# Patient Record
Sex: FEMALE | Race: BLACK OR AFRICAN AMERICAN | Employment: FULL TIME | URBAN - METROPOLITAN AREA
[De-identification: names, ages, dates, MRNs, and addresses within clinical notes are randomized per-mention and may not be internally consistent; named-entity substitution may affect disease eponyms.]

---

## 2017-02-28 ENCOUNTER — OFFICE VISIT (OUTPATIENT)
Dept: INTERNAL MEDICINE CLINIC | Age: 37
End: 2017-02-28

## 2017-02-28 VITALS
SYSTOLIC BLOOD PRESSURE: 120 MMHG | BODY MASS INDEX: 41.48 KG/M2 | DIASTOLIC BLOOD PRESSURE: 86 MMHG | WEIGHT: 243 LBS | HEART RATE: 86 BPM | HEIGHT: 64 IN | TEMPERATURE: 98.1 F | OXYGEN SATURATION: 100 %

## 2017-02-28 DIAGNOSIS — R11.0 NAUSEA: ICD-10-CM

## 2017-02-28 DIAGNOSIS — R68.83 CHILLS: ICD-10-CM

## 2017-02-28 DIAGNOSIS — R50.9 FEVER, UNSPECIFIED FEVER CAUSE: ICD-10-CM

## 2017-02-28 DIAGNOSIS — J06.9 URI, ACUTE: Primary | ICD-10-CM

## 2017-02-28 DIAGNOSIS — L03.313 CELLULITIS OF CHEST WALL: ICD-10-CM

## 2017-02-28 DIAGNOSIS — Z00.00 ROUTINE GENERAL MEDICAL EXAMINATION AT A HEALTH CARE FACILITY: ICD-10-CM

## 2017-02-28 LAB
QUICKVUE INFLUENZA TEST: NEGATIVE
VALID INTERNAL CONTROL?: YES

## 2017-02-28 RX ORDER — SULFAMETHOXAZOLE AND TRIMETHOPRIM 800; 160 MG/1; MG/1
1 TABLET ORAL 2 TIMES DAILY
Qty: 14 TAB | Refills: 0 | Status: SHIPPED | OUTPATIENT
Start: 2017-02-28 | End: 2017-03-07

## 2017-02-28 RX ORDER — MUPIROCIN 20 MG/G
OINTMENT TOPICAL DAILY
Qty: 22 G | Refills: 0 | Status: SHIPPED | OUTPATIENT
Start: 2017-02-28

## 2017-02-28 NOTE — PATIENT INSTRUCTIONS
It was a pleasure to see you! As discussed:    Upper respiratory tract infection (URI)  You have a bacterial URI. - Take the antibiotics as prescribed. -Use nasal saline spray 3-4 times/day  -Start taking a non sedating antihistamine such as Claritin, Allegra or Zyrtec (generic is fine)  For your  Symptoms:  -Sore throat: Cepacol or similar lozenges, Salt water gargles  -Itching: Thick creams/ lotions; Nonsedating antihistamine such as Allegra, Zyrtec, or Claritin (generic is fine)  -Pain/ Aches: You can use Ibuprofen (no more than 2400 mg/day) or Aleve (no more than 880mg/ day) as needed. Do not use any other NSAIDs while on this medication. Do not use NSAIDs if you have high blood pressure or history of ulcers or abnormal bleeding. OR   -Take Tylenol as needed for headache and body aches (every 4-8 hours, do not use more than 3000mg in one day)         Cellulitis: Care Instructions  Your Care Instructions    Cellulitis is a skin infection. It often occurs after a break in the skin from a scrape, cut, bite, or puncture, or after a rash. The doctor has checked you carefully, but problems can develop later. If you notice any problems or new symptoms, get medical treatment right away. Follow-up care is a key part of your treatment and safety. Be sure to make and go to all appointments, and call your doctor if you are having problems. It's also a good idea to know your test results and keep a list of the medicines you take. How can you care for yourself at home? · Take your antibiotics as directed. Do not stop taking them just because you feel better. You need to take the full course of antibiotics. · Prop up the infected area on pillows to reduce pain and swelling. Try to keep the area above the level of your heart as often as you can. · If your doctor told you how to care for your wound, follow your doctor's instructions.  If you did not get instructions, follow this general advice:  ¨ Wash the wound with clean water 2 times a day. Don't use hydrogen peroxide or alcohol, which can slow healing. ¨ You may cover the wound with a thin layer of petroleum jelly, such as Vaseline, and a nonstick bandage. ¨ Apply more petroleum jelly and replace the bandage as needed. · Be safe with medicines. Take pain medicines exactly as directed. ¨ If the doctor gave you a prescription medicine for pain, take it as prescribed. ¨ If you are not taking a prescription pain medicine, ask your doctor if you can take an over-the-counter medicine. To prevent cellulitis in the future  · Try to prevent cuts, scrapes, or other injuries to your skin. Cellulitis most often occurs where there is a break in the skin. · If you get a scrape, cut, mild burn, or bite, wash the wound with clean water as soon as you can to help avoid infection. Don't use hydrogen peroxide or alcohol, which can slow healing. · If you have swelling in your legs (edema), support stockings and good skin care may help prevent leg sores and cellulitis. · Take care of your feet, especially if you have diabetes or other conditions that increase the risk of infection. Wear shoes and socks. Do not go barefoot. If you have athlete's foot or other skin problems on your feet, talk to your doctor about how to treat them. When should you call for help? Call your doctor now or seek immediate medical care if:  · You have signs that your infection is getting worse, such as:  ¨ Increased pain, swelling, warmth, or redness. ¨ Red streaks leading from the area. ¨ Pus draining from the area. ¨ A fever. · You get a rash. Watch closely for changes in your health, and be sure to contact your doctor if:  · You are not getting better after 1 day (24 hours). · You do not get better as expected. Where can you learn more? Go to http://vince-reva.info/. Lonnie Pelaez in the search box to learn more about \"Cellulitis: Care Instructions. \"  Current as of: February 5, 2016  Content Version: 11.1  © 6314-9449 [employer]. Care instructions adapted under license by [employer] (which disclaims liability or warranty for this information). If you have questions about a medical condition or this instruction, always ask your healthcare professional. [address] any warranty or liability for your use of this information.

## 2017-02-28 NOTE — PROGRESS NOTES
HISTORY OF PRESENT ILLNESS  Nash Pressley is a 31 y.o. female. Cold Symptoms   The current episode started more than 2 days ago. The problem has not changed since onset. The cough is productive of purulent sputum. Patient reports a subjective fever - was not measured. Associated symptoms include chills, sweats, ear congestion, rhinorrhea, myalgias, shortness of breath and nausea (diarrhea ). Pertinent negatives include no ear pain, no headaches, no sore throat and no wheezing. Treatments tried: Mucinex, theraflu, APAP  The treatment provided mild relief. 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 Breast pain   The current episode started more than 2 days ago (5 days). The problem has been gradually worsening. Associated symptoms include shortness of breath. Pertinent negatives include no headaches. Associated symptoms comments: Slept on phone   . Treatments tried: Neosporin. The treatment provided mild relief. Review of Systems   Constitutional: Positive for chills. HENT: Positive for rhinorrhea. Negative for ear pain and sore throat. Respiratory: Positive for shortness of breath. Negative for wheezing. Gastrointestinal: Positive for nausea (diarrhea ). Musculoskeletal: Positive for myalgias. Neurological: Negative for headaches. Patient Active Problem List    Diagnosis Date Noted    Allergic rhinitis 06/11/2010       Current Outpatient Prescriptions   Medication Sig Dispense Refill    MULTIVITAMIN PO Take 1 Tab by mouth daily.  ergocalciferol (ERGOCALCIFEROL) 50,000 unit capsule Take 1 capsule by mouth every seven (7) days. 8 capsule 0    B.infantis-B.ani-B.long-B.bifi (PROBIOTIC 4X) 10-15 mg TbEC Take  by mouth.  CHROM ABDIRASHID/BRINDAL BERRY (GARCINIA CAMBOGIA PO) Take  by mouth.          No Known Allergies   Visit Vitals    /86 (BP 1 Location: Right arm, BP Patient Position: Sitting)    Pulse 86    Temp 98.1 °F (36.7 °C) (Oral)    Ht 5' 4\" (1.626 m)    Wt 243 lb (110.2 kg)    SpO2 100%    BMI 41.71 kg/m2       Physical Exam   Constitutional: She is oriented to person, place, and time. She appears well-developed. No distress. HENT:   Nose: Mucosal edema present. No rhinorrhea or septal deviation. Right sinus exhibits no maxillary sinus tenderness and no frontal sinus tenderness. Left sinus exhibits no maxillary sinus tenderness and no frontal sinus tenderness. Mouth/Throat: Posterior oropharyngeal erythema present. No posterior oropharyngeal edema. Eyes: Conjunctivae are normal.   Neck: Neck supple. Cardiovascular: Normal rate, regular rhythm and normal heart sounds. Pulmonary/Chest: Effort normal and breath sounds normal. No respiratory distress. She has no wheezes. She has no rales. She exhibits no tenderness. Lymphadenopathy:     She has cervical adenopathy. Neurological: She is alert and oriented to person, place, and time. Psychiatric: She has a normal mood and affect. Left breast- erythema and calor in area depicted. No induration or fluctuance   ASSESSMENT and PLAN  Surjit Carroll was seen today for cold symptoms. Diagnoses and all orders for this visit:    URI, acute- possibly bacterial given s/s. Abx also indicated for cellulitis on chest. Red flags to warrant ER or earlier clinical evaluation reviewed. See AVS for full details of plan and patient discussion. Cellulitis of chest wall- mild, no e/o abscess. Abx as ordered Red flags to warrant ER or earlier clinical evaluation reviewed. Chills  -     AMB POC RAPID INFLUENZA TEST    Fever, unspecified fever cause  -     AMB POC RAPID INFLUENZA TEST    Nausea  -     AMB POC RAPID INFLUENZA TEST      Orders Placed This Encounter    AMB POC RAPID INFLUENZA TEST    MULTIVITAMIN PO     Sig: Take 1 Tab by mouth daily.     trimethoprim-sulfamethoxazole (BACTRIM DS, SEPTRA DS) 160-800 mg per tablet     Sig: Take 1 Tab by mouth two (2) times a day for 7 days. Dispense:  14 Tab     Refill:  0       Follow-up Disposition:  Return in about 3 months (around 5/28/2017) for Physical - 30 minute appointment. Medication risks/benefits/costs/interactions/alternatives discussed with patient. Fadumo Francisco  was given an after visit summary which includes diagnoses, current medications, & vitals. she expressed understanding with the diagnosis and plan.

## 2017-02-28 NOTE — MR AVS SNAPSHOT
Visit Information Date & Time Provider Department Dept. Phone Encounter #  
 2/28/2017  1:45 PM Boston Forte MD [address] [id number] Internal Medicine [phone] [id number] Follow-up Instructions Return in about 3 months (around 5/28/2017) for Physical - 30 minute appointment. Upcoming Health Maintenance Date Due DTaP/Tdap/Td series (1 - Tdap) 11/4/2003 PAP AKA CERVICAL CYTOLOGY 11/4/2003 INFLUENZA AGE 9 TO ADULT 8/1/2016 Allergies as of 2/28/2017  Review Complete On: 2/28/2017 By: Boston Forte MD  
 No Known Allergies Current Immunizations  Reviewed on 1/23/2015 No immunizations on file. Not reviewed this visit You Were Diagnosed With   
  
 Codes Comments URI, acute    -  Primary ICD-10-CM: J06.9 ICD-9-CM: 465.9 Chills     ICD-10-CM: R68.83 ICD-9-CM: 780.64 Fever, unspecified fever cause     ICD-10-CM: R50.9 ICD-9-CM: 780.60 Nausea     ICD-10-CM: R11.0 ICD-9-CM: 787.02 Cellulitis of chest wall     ICD-10-CM: L03.313 ICD-9-CM: 022. 2 Vitals BP  
  
  
  
  
  
 120/86 (BP 1 Location: Right arm, BP Patient Position: Sitting) Vitals History BMI and BSA Data Body Mass Index Body Surface Area 41.71 kg/m 2 2.23 m 2 Preferred Pharmacy [facility] Phone [id number] [phone] Your Updated Medication List  
  
   
This list is accurate as of: 2/28/17  2:47 PM.  Always use your most recent med list.  
  
  
  
  
 ergocalciferol 50,000 unit capsule Commonly known as:  ERGOCALCIFEROL Take 1 capsule by mouth every seven (7) days. GARCINIA CAMBOGIA PO Take  by mouth. MULTIVITAMIN PO Take 1 Tab by mouth daily. PROBIOTIC 4X 10-15 mg Tbec Generic drug:  B.infantis-B.ani-B.long-B.bifi Take  by mouth. trimethoprim-sulfamethoxazole 160-800 mg per tablet Commonly known as:  BACTRIM DS, SEPTRA DS  
 Take 1 Tab by mouth two (2) times a day for 7 days. Prescriptions Sent to Pharmacy Refills  
 trimethoprim-sulfamethoxazole (BACTRIM DS, SEPTRA DS) 160-800 mg per tablet 0 Sig: Take 1 Tab by mouth two (2) times a day for 7 days. Class: Normal  
 Pharmacy: [address] [place] [address] #: [phone] Route: Oral  
  
We Performed the Following AMB POC RAPID INFLUENZA TEST [88833 CPT(R)] Follow-up Instructions Return in about 3 months (around 5/28/2017) for Physical - 30 minute appointment. Patient Instructions It was a pleasure to see you! As discussed: 
 
Upper respiratory tract infection (URI) You have a bacterial URI. - Take the antibiotics as prescribed. -Use nasal saline spray 3-4 times/day 
-Start taking a non sedating antihistamine such as Claritin, Allegra or Zyrtec (generic is fine) For your  Symptoms: 
-Sore throat: Cepacol or similar lozenges, Salt water gargles -Itching: Thick creams/ lotions; Nonsedating antihistamine such as Allegra, Zyrtec, or Claritin (generic is fine) 
-Pain/ Aches: You can use Ibuprofen (no more than 2400 mg/day) or Aleve (no more than 880mg/ day) as needed. Do not use any other NSAIDs while on this medication. Do not use NSAIDs if you have high blood pressure or history of ulcers or abnormal bleeding. OR  
-Take Tylenol as needed for headache and body aches (every 4-8 hours, do not use more than 3000mg in one day) Cellulitis: Care Instructions Your Care Instructions Cellulitis is a skin infection. It often occurs after a break in the skin from a scrape, cut, bite, or puncture, or after a rash. The doctor has checked you carefully, but problems can develop later. If you notice any problems or new symptoms, get medical treatment right away. Follow-up care is a key part of your treatment and safety.  Be sure to make and go to all appointments, and call your doctor if you are having problems. It's also a good idea to know your test results and keep a list of the medicines you take. How can you care for yourself at home? · Take your antibiotics as directed. Do not stop taking them just because you feel better. You need to take the full course of antibiotics. · Prop up the infected area on pillows to reduce pain and swelling. Try to keep the area above the level of your heart as often as you can. · If your doctor told you how to care for your wound, follow your doctor's instructions. If you did not get instructions, follow this general advice: ¨ Wash the wound with clean water 2 times a day. Don't use hydrogen peroxide or alcohol, which can slow healing. ¨ You may cover the wound with a thin layer of petroleum jelly, such as Vaseline, and a nonstick bandage. ¨ Apply more petroleum jelly and replace the bandage as needed. · Be safe with medicines. Take pain medicines exactly as directed. ¨ If the doctor gave you a prescription medicine for pain, take it as prescribed. ¨ If you are not taking a prescription pain medicine, ask your doctor if you can take an over-the-counter medicine. To prevent cellulitis in the future · Try to prevent cuts, scrapes, or other injuries to your skin. Cellulitis most often occurs where there is a break in the skin. · If you get a scrape, cut, mild burn, or bite, wash the wound with clean water as soon as you can to help avoid infection. Don't use hydrogen peroxide or alcohol, which can slow healing. · If you have swelling in your legs (edema), support stockings and good skin care may help prevent leg sores and cellulitis. · Take care of your feet, especially if you have diabetes or other conditions that increase the risk of infection. Wear shoes and socks. Do not go barefoot. If you have athlete's foot or other skin problems on your feet, talk to your doctor about how to treat them. When should you call for help? Call your doctor now or seek immediate medical care if: 
· You have signs that your infection is getting worse, such as: 
¨ Increased pain, swelling, warmth, or redness. ¨ Red streaks leading from the area. ¨ Pus draining from the area. ¨ A fever. · You get a rash. Watch closely for changes in your health, and be sure to contact your doctor if: 
· You are not getting better after 1 day (24 hours). · You do not get better as expected. Where can you learn more? Go to http://vince-reva.info/. João Conway in the search box to learn more about \"Cellulitis: Care Instructions. \" Current as of: February 5, 2016 Content Version: 11.1 © 3398-9742 [employer]. Care instructions adapted under license by [employer] (which disclaims liability or warranty for this information). If you have questions about a medical condition or this instruction, always ask your healthcare professional. [place] [address] any warranty or liability for your use of this information. Introducing [facility]! Dear Gerardo Rosas: Thank you for requesting a [employer] account. Our records indicate that you already have an active [employer] account. You can access your account anytime at [email or web address] Did you know that you can access your hospital and ER discharge instructions at any time in [employer]? You can also review all of your test results from your hospital stay or ER visit. Additional Information If you have questions, please visit the Frequently Asked Questions section of the [employer] website at [email or web address] Remember, [employer] is NOT to be used for urgent needs. For medical emergencies, dial 911. Now available from your iPhone and Android! Please provide this summary of care documentation to your next provider. Your primary care clinician is listed as Jennett Lanes. If you have any questions after today's visit, please call [phone].

## 2017-04-11 DIAGNOSIS — G47.00 INSOMNIA, UNSPECIFIED TYPE: Primary | ICD-10-CM

## 2017-04-12 ENCOUNTER — DOCUMENTATION ONLY (OUTPATIENT)
Dept: INTERNAL MEDICINE CLINIC | Age: 37
End: 2017-04-12

## 2017-05-09 ENCOUNTER — OFFICE VISIT (OUTPATIENT)
Dept: SLEEP MEDICINE | Age: 37
End: 2017-05-09

## 2017-05-09 ENCOUNTER — TELEPHONE (OUTPATIENT)
Dept: SLEEP MEDICINE | Age: 37
End: 2017-05-09

## 2017-05-09 VITALS — BODY MASS INDEX: 41.83 KG/M2 | WEIGHT: 245 LBS | TEMPERATURE: 97.7 F | HEIGHT: 64 IN

## 2017-05-09 DIAGNOSIS — G47.33 OBSTRUCTIVE SLEEP APNEA (ADULT) (PEDIATRIC): Primary | ICD-10-CM

## 2017-05-09 DIAGNOSIS — G47.00 INSOMNIA, UNSPECIFIED TYPE: ICD-10-CM

## 2017-05-09 NOTE — Clinical Note
Thank you for the referral.  I will keep you informed of her progress.  [address], [place]

## 2017-05-09 NOTE — PATIENT INSTRUCTIONS
[address] [facility] [place]. [place], [address]  Tel.  [phone]  Fax. 100 [facility] 60  [place], [address] [facility]  Tel.  [phone]  Fax. [phone] [address]  Tel.  [phone]  Fax. [phone]     Sleep Apnea: After Your Visit  Your Care Instructions  Sleep apnea occurs when you frequently stop breathing for 10 seconds or longer during sleep. It can be mild to severe, based on the number of times per hour that you stop breathing or have slowed breathing. Blocked or narrowed airways in your nose, mouth, or throat can cause sleep apnea. Your airway can become blocked when your throat muscles and tongue relax during sleep. Sleep apnea is common, occurring in 1 out of 20 individuals. Individuals having any of the following characteristics should be evaluated and treated right away due to high risk and detrimental consequences from untreated sleep apnea:  1. Obesity  2. Congestive Heart failure  3. Atrial Fibrillation  4. Uncontrolled Hypertension  5. Type II Diabetes  6. Night-time Arrhythmias  7. Stroke  8. Pulmonary Hypertension  9. High-risk Driving Populations (pilots, truck drivers, etc.)  10. Patients Considering Weight-loss Surgery    How do you know you have sleep apnea? You probably have sleep apnea if you answer 'yes' to 3 or more of the following questions:  S - Have you been told that you Snore? T - Are you often Tired during the day? O - Has anyone Observed you stop breathing while sleeping? P- Do you have (or are being treated for) high blood Pressure? B - Are you obese (Body Mass Index > 35)? A - Is your Age 48years old or older? N - Is your Neck size greater than 16 inches? G - Are you male Gender? A sleep physician can prescribe a breathing device that prevents tissues in the throat from blocking your airway.  Or your doctor may recommend using a dental device (oral breathing device) to help keep your airway open. In some cases, surgery may be needed to remove enlarged tissues in the throat. Follow-up care is a key part of your treatment and safety. Be sure to make and go to all appointments, and call your doctor if you are having problems. It's also a good idea to know your test results and keep a list of the medicines you take. How can you care for yourself at home? · Lose weight, if needed. It may reduce the number of times you stop breathing or have slowed breathing. · Go to bed at the same time every night. · Sleep on your side. It may stop mild apnea. If you tend to roll onto your back, sew a pocket in the back of your pajama top. Put a tennis ball into the pocket, and stitch the pocket shut. This will help keep you from sleeping on your back. · Avoid alcohol and medicines such as sleeping pills and sedatives before bed. · Do not smoke. Smoking can make sleep apnea worse. If you need help quitting, talk to your doctor about stop-smoking programs and medicines. These can increase your chances of quitting for good. · Prop up the head of your bed 4 to 6 inches by putting bricks under the legs of the bed. · Treat breathing problems, such as a stuffy nose, caused by a cold or allergies. · Use a continuous positive airway pressure (CPAP) breathing machine if lifestyle changes do not help your apnea and your doctor recommends it. The machine keeps your airway from closing when you sleep. · If CPAP does not help you, ask your doctor whether you should try other breathing machines. A bilevel positive airway pressure machine has two types of air pressureâone for breathing in and one for breathing out. Another device raises or lowers air pressure as needed while you breathe. · If your nose feels dry or bleeds when using one of these machines, talk with your doctor about increasing moisture in the air. A humidifier may help.   · If your nose is runny or stuffy from using a breathing machine, talk with your doctor about using decongestants or a corticosteroid nasal spray. When should you call for help? Watch closely for changes in your health, and be sure to contact your doctor if:  · You still have sleep apnea even though you have made lifestyle changes. · You are thinking of trying a device such as CPAP. · You are having problems using a CPAP or similar machine. Where can you learn more? Go to Jangl SMS. Enter D728 in the search box to learn more about \"Sleep Apnea: After Your Visit. \"   © 9902-0963 Healthwise, judo. Care instructions adapted under license by Royce Goode (which disclaims liability or warranty for this information). This care instruction is for use with your licensed healthcare professional. If you have questions about a medical condition or this instruction, always ask your healthcare professional. Rich Or any warranty or liability for your use of this information. PROPER SLEEP HYGIENE    What to avoid  · Do not have drinks with caffeine, such as coffee or black tea, for 8 hours before bed. · Do not smoke or use other types of tobacco near bedtime. Nicotine is a stimulant and can keep you awake. · Avoid drinking alcohol late in the evening, because it can cause you to wake in the middle of the night. · Do not eat a big meal close to bedtime. If you are hungry, eat a light snack. · Do not drink a lot of water close to bedtime, because the need to urinate may wake you up during the night. · Do not read or watch TV in bed. Use the bed only for sleeping and sexual activity. What to try  · Go to bed at the same time every night, and wake up at the same time every morning. Do not take naps during the day. · Keep your bedroom quiet, dark, and cool. · Get regular exercise, but not within 3 to 4 hours of your bedtime. .  · Sleep on a comfortable pillow and mattress.   · If watching the clock makes you anxious, turn it facing away from you so you cannot see the time. · If you worry when you lie down, start a worry book. Well before bedtime, write down your worries, and then set the book and your concerns aside. · Try meditation or other relaxation techniques before you go to bed. · If you cannot fall asleep, get up and go to another room until you feel sleepy. Do something relaxing. Repeat your bedtime routine before you go to bed again. · Make your house quiet and calm about an hour before bedtime. Turn down the lights, turn off the TV, log off the computer, and turn down the volume on music. This can help you relax after a busy day. Drowsy Driving  The [address] [place] [address] Road Traffic Safety Administration cites drowsiness as a causing factor in more than 819,346 police reported crashes annually, resulting in 76,000 injuries and 1,500 deaths. Other surveys suggest 55% of people polled have driven while drowsy in the past year, 23% had fallen asleep but not crashed, 3% crashed, and 2% had and accident due to drowsy driving. Who is at risk? Young Drivers: One study of drowsy driving accidents states that 55% of the drivers were under 25 years. Of those, 75% were male. Shift Workers and Travelers: People who work overnight or travel across time zones frequently are at higher risk of experiencing Circadian Rhythm Disorders. They are trying to work and function when their body is programed to sleep. Sleep Deprived: Lack of sleep has a serious impact on your ability to pay attention or focus on a task. Consistently getting less than the average of 8 hours your body needs creates partial or cumulative sleep deprivation. Untreated Sleep Disorders: Sleep Apnea, Narcolepsy, R.L.S., and other sleep disorders (untreated) prevent a person from getting enough restful sleep. This leads to excessive daytime sleepiness and increases the risk for drowsy driving accidents by up to 7 times.   Medications / Alcohol: Even over the counter medications can cause drowsiness. Medications that impair a drivers attention should have a warning label. Alcohol naturally makes you sleepy and on its own can cause accidents. Combined with excessive drowsiness its effects are amplified. Signs of Drowsy Driving:   * You don't remember driving the last few miles   * You may drift out of your richelle   * You are unable to focus and your thoughts wander   * You may yawn more often than normal   * You have difficulty keeping your eyes open / nodding off   * Missing traffic signs, speeding, or tailgating  Prevention-   Good sleep hygiene, lifestyle and behavioral choices have the most impact on drowsy driving. There is no substitute for sleep and the average person requires 8 hours nightly. If you find yourself driving drowsy, stop and sleep. Consider the sleep hygiene tips provided during your visit as well. Medication Refill Policy: Refills for all medications require 1 week advance notice. Please have your pharmacy fax a refill request. We are unable to fax, or call in \"controled substance\" medications and you will need to pick these prescriptions up from our office. Vinsula Activation    Thank you for requesting access to Vinsula. Please follow the instructions below to securely access and download your online medical record. Vinsula allows you to send messages to your doctor, view your test results, renew your prescriptions, schedule appointments, and more. How Do I Sign Up? 1. In your internet browser, go to [email or web address]. 2. Click on the First Time User? Click Here link in the Sign In box. You will see the New Member Sign Up page. 3. Enter your Vinsula Access Code exactly as it appears below. You will not need to use this code after youve completed the sign-up process. If you do not sign up before the expiration date, you must request a new code. Vinsula Access Code:  Activation code not generated  Current Vinsula Status: Active (This is the date your walkby access code will )    4. Enter the last four digits of your Social Security Number (xxxx) and Date of Birth (mm/dd/yyyy) as indicated and click Submit. You will be taken to the next sign-up page. 5. Create a Kingfish Groupt ID. This will be your walkby login ID and cannot be changed, so think of one that is secure and easy to remember. 6. Create a walkby password. You can change your password at any time. 7. Enter your Password Reset Question and Answer. This can be used at a later time if you forget your password. 8. Enter your e-mail address. You will receive e-mail notification when new information is available in [address] 9. Click Sign Up. You can now view and download portions of your medical record. 10. Click the Download Summary menu link to download a portable copy of your medical information. Additional Information    If you have questions, please call [phone]. Remember, walkby is NOT to be used for urgent needs. For medical emergencies, dial 920. [address] [place]. [place], [address]  Tel.  [phone]  Fax. 100 [facility] 60  [address]  Tel.  [phone]  Fax. [phone] [address] [place] [address]  Tel.  [phone]  Fax. [phone]       Insomnia: After Your Visit  Your Care Instructions  Insomnia is the inability to sleep well. It is a common problem for most people at some time. Insomnia may make it hard for you to get to sleep, stay asleep, or sleep as long as you need to. This can make you tired and grouchy during the day. It can also make you forgetful, less effective at work, and unhappy. Insomnia can be caused by conditions such as depression or anxiety. Pain can also affect your ability to sleep. When these problems are solved, the insomnia usually clears up. But sometimes bad sleep habits can cause insomnia.   If insomnia is affecting your work or your enjoyment of life, you can take steps to improve your sleep. Follow-up care is a key part of your treatment and safety. Be sure to make and go to all appointments, and call your doctor if you are having problems. It's also a good idea to know your test results and keep a list of the medicines you take. How can you care for yourself at home? What to avoid  · Do not have drinks with caffeine, such as coffee or black tea, for 8 hours before bed. · Do not smoke or use other types of tobacco near bedtime. Nicotine is a stimulant and can keep you awake. · Avoid drinking alcohol late in the evening, because it can cause you to wake in the middle of the night. · Do not eat a big meal close to bedtime. If you are hungry, eat a light snack. · Do not drink a lot of water close to bedtime, because the need to urinate may wake you up during the night. · Do not read or watch TV in bed. Use the bed only for sleeping and sexual activity. What to try  · Go to bed at the same time every night, and wake up at the same time every morning. Do not take naps during the day. · Keep your bedroom quiet, dark, and cool. · Get regular exercise, but not within 3 to 4 hours of your bedtime. .  · Sleep on a comfortable pillow and mattress. · If watching the clock makes you anxious, turn it facing away from you so you cannot see the time. · If you worry when you lie down, start a worry book. Well before bedtime, write down your worries, and then set the book and your concerns aside. · Try meditation or other relaxation techniques before you go to bed. · If you cannot fall asleep, get up and go to another room until you feel sleepy. Do something relaxing. Repeat your bedtime routine before you go to bed again. · Make your house quiet and calm about an hour before bedtime. Turn down the lights, turn off the TV, log off the computer, and turn down the volume on music. This can help you relax after a busy day.   When should you call for help? Watch closely for changes in your health, and be sure to contact your doctor if:  · Your efforts to improve your sleep do not work. · Your insomnia gets worse. · You fall asleep during the day. Where can you learn more? Go to Olive [email or web address].be  Enter P513 in the search box to learn more about \"Insomnia: After Your Visit. \"   © 6873-1570 Healthwise, Incorporated. Care instructions adapted under license by Ale Mathias (which disclaims liability or warranty for this information). This care instruction is for use with your licensed healthcare professional. If you have questions about a medical condition or this instruction, always ask your healthcare professional. [address] any warranty or liability for your use of this information. Content Version: 6.3.83173; Last Revised: June 26, 2010    Drowsy Driving: The [place] [address] cites drowsiness as a causing factor in more than 038,919 police reported crashes annually, resulting in 76,000 injuries and 1,500 deaths. Other surveys suggest 55% of people polled have driven while drowsy in the past year, 23% had fallen asleep but not crashed, 3% crashed, and 2% had and accident due to drowsy driving. Who is at risk? Young Drivers: One study of drowsy driving accidents states that 55% of the drivers were under 25 years. Of those, 75% were male. Shift Workers and Travelers: People who work overnight or travel across time zones frequently are at higher risk of experiencing Circadian Rhythm Disorders. They are trying to work and function when their body is programed to sleep. Sleep Deprived: Lack of sleep has a serious impact on your ability to pay attention or focus on a task. Consistently getting less than the average of 8 hours your body needs creates partial or cumulative sleep deprivation.    Untreated Sleep Disorders: Sleep Apnea, Narcolepsy, R.L.S., and other sleep disorders (untreated) prevent a person from getting enough restful sleep. This leads to excessive daytime sleepiness and increases the risk for drowsy driving accidents by up to 7 times. Medications / Alcohol: Even over the counter medications can cause drowsiness. Medications that impair a drivers attention should have a warning label. Alcohol naturally makes you sleepy and on its own can cause accidents. Combined with excessive drowsiness its effects are amplified. Signs of Drowsy Driving:   * You don't remember driving the last few miles   * You may drift out of your richelle   * You are unable to focus and your thoughts wander   * You may yawn more often than normal   * You have difficulty keeping your eyes open / nodding off   * Missing traffic signs, speeding, or tailgating  Prevention-   Good sleep hygiene, lifestyle and behavioral choices have the most impact on drowsy driving. There is no substitute for sleep and the average person requires 8 hours nightly. If you find yourself driving drowsy, stop and sleep. Consider the sleep hygiene tips provided during your visit as well. Medication Refill Policy: Refills for all medications require 1 week advance notice. Please have your pharmacy fax a refill request. We are unable to fax, or call in \"controled substance\" medications and you will need to pick these prescriptions up from our office. SurveySnap Activation    Thank you for requesting access to SurveySnap. Please follow the instructions below to securely access and download your online medical record. SurveySnap allows you to send messages to your doctor, view your test results, renew your prescriptions, schedule appointments, and more. How Do I Sign Up?    11. In your internet browser, go to [email or web address] 12. Click on the First Time User? Click Here link in the Sign In box. You will see the New Member Sign Up page.   15. Enter your SurveySnap Access Code exactly as it appears below. You will not need to use this code after youve completed the sign-up process. If you do not sign up before the expiration date, you must request a new code. Octane[employer] International Access Code: Activation code not generated  Current Oct[employer] Status: Active (This is the date your Oct[employer] access code will )    14. Enter the last four digits of your Social Security Number (xxxx) and Date of Birth (mm/dd/yyyy) as indicated and click Submit. You will be taken to the next sign-up page. 15. Create a Hippo Manager Softwaret ID. This will be your Octane[employer] login ID and cannot be changed, so think of one that is secure and easy to remember. 12. Create a Octane[employer] password. You can change your password at any time. 16. Enter your Password Reset Question and Answer. This can be used at a later time if you forget your password. 25. Enter your e-mail address. You will receive e-mail notification when new information is available in [address] 19. Click Sign Up. You can now view and download portions of your medical record. 20. Click the Download Summary menu link to download a portable copy of your medical information. Additional Information    If you have questions, please call [phone]. Remember, [employer] is NOT to be used for urgent needs. For medical emergencies, dial 911.

## 2017-05-09 NOTE — PROGRESS NOTES
[address] [facility] [place]. [place], [address]  Tel.  [phone]  Fax. 100 [facility] 60  [place], [address] [facility]  Tel.  [phone]  Fax. [phone] [address]   Tel.  [phone]  Fax. [phone]         Subjective:      Charlee Ward is an 31 y.o. female referred for evaluation for a sleep disorder. She complains of snoring, periods of not breathing associated with difficulty falling asleep once awakened, and she is concerned that she is late for school. Symptoms began several years ago, gradually worsening since that time. She usually can fall asleep in variable minutes. Family or house members note snoring, snorting. She denies falling asleep while at work, driving. Charlee Ward does wake up frequently at night. She is bothered by waking up too early and left unable to get back to sleep. She actually sleeps about 5 hours at night and wakes up about 3 (between 1-3) times during the night. She   work shifts: [employer] indicates she does get too little sleep at night. Her bedtime is 2230. She awakens at 7 am  She does take naps. She takes 2 naps a week lasting 2, Hour(s). She has the following observed behaviors: Pauses in breathing, Grinding teeth;  . 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 She says she needs noise at night to help her fall asleep. She sleeps with the tv on. Spencer Sleepiness Score: 9      No Known Allergies      Current Outpatient Prescriptions:     PAPAYA ENZYME PO, Take  by mouth., Disp: , Rfl:     MULTIVITAMIN PO, Take 1 Tab by mouth daily. , Disp: , Rfl:     B.infantis-B.ani-B.long-B.bifi (PROBIOTIC 4X) 10-15 mg TbEC, Take  by mouth., Disp: , Rfl:     mupirocin (BACTROBAN) 2 % ointment, Apply  to affected area daily. , Disp: 22 g, Rfl: 0    ergocalciferol (ERGOCALCIFEROL) 50,000 unit capsule, Take 1 capsule by mouth every seven (7) days. , Disp: 8 capsule, Rfl: 0    CHROM ABDIRASHID/BRINDAL BERRY (ALEXIS CHRISTYIA PO), Take  by mouth., Disp: , Rfl:      She  has a past medical history of Anemia NEC and Morbid obesity ([address] She  has no past surgical history on file. She family history includes Diabetes in her mother; Hypertension in her father and paternal grandmother. She  reports that she has never smoked. She has never used smokeless tobacco. She reports that she drinks alcohol. She reports that she does not use illicit drugs. Review of Systems:  Constitutional: +weight gain. Eyes:  No blurred vision. CVS:  No significant chest pain  Pulm:  No significant shortness of breath  GI:  No significant nausea or vomiting  :  No significant nocturia  Musculoskeletal:  No significant joint pain at night  Skin:  No significant rashes  Neuro:  No significant dizziness   Psych:  No active mood issues, mild anxiety    Sleep Review of Systems: notable for no difficulty falling asleep; +frequent awakenings at night;  regular dreaming noted; no nightmares ; + early morning headaches; no memory problems; no concentration issues; no history of any automobile or occupational accidents due to daytime drowsiness. Objective:     Visit Vitals    Temp 97.7 °F (36.5 °C)    Ht 5' 4\" (1.626 m)    Wt 245 lb (111.1 kg)    BMI 42.05 kg/m2         General:   Not in acute distress   Eyes:  Anicteric sclerae, no obvious strabismus   Nose:  No obvious nasal septum deviation    Oropharynx:   Class 3 oropharyngeal outlet, thick tongue base, enlarged and boggy uvula, low-lying soft palate, narrow tonsilo-pharyngeal pilars   Tonsils:   tonsils are present and normal   Neck:   Neck circ.  in \"inches\": 16.5; midline trachea   Chest/Lungs:  Equal lung expansion, clear on auscultation    CVS:  Normal rate, regular rhythm; no JVD   Skin:  Warm to touch; no obvious rashes   Neuro:  No focal deficits ; no obvious tremor    Psych:  Normal affect,  normal countenance;          Assessment:       ICD-10-CM ICD-9-CM    1. Obstructive sleep apnea (adult) (pediatric) G47.33 327.23 SLEEP STUDY UNATTENDED, 4 CHANNEL   2. Insomnia, unspecified type G47.00 780.52          Plan:     * The patient currently has a Moderate Risk for having sleep apnea. STOP-BANG score 4.  * PSG was ordered for initial evaluation. She prefers a home sleep test.  * She was provided information on sleep apnea including coresponding risk factors and the importance of proper treatment. * Counseling was provided regarding proper sleep hygiene and safe driving. * I will call her with the results. Treatment options for sleep apnea were reviewed. she is not against a trial of PAP if found to have significant sleep apnea. 2. Insomnia - I have advised a regular sleep wake cycle. she  was advised to avoid looking at the clock during the night. Ideally, the clock face should be turned away. she was advised to minimize caffeine use and to avoid caffeine-containing beverages 8 hours prior to bedtime. Naps were discouraged. A regular exercise schedule, at least 3 hours before bedtime, would be beneficial to improving sleep quality. she was advised to avoid evening light and to use sunglasses in the late afternoon. Watching TV, using laptops, tablets and smartphones in the evening was discouraged. she  was advised to keep the bedroom cool and dark. .  All of her questions were addressed. Thank you for allowing us to participate in your patient's medical care. We'll keep you updated on these investigations.   Governor Percy MD  Diplomate in Sleep Medicine  [facility]

## 2017-05-16 NOTE — TELEPHONE ENCOUNTER
Insurance approved for [employer]. Called patient to schedule .   She said she will come on 5/17/17 between 4 - 5 pm.

## 2017-05-20 ENCOUNTER — HOSPITAL ENCOUNTER (OUTPATIENT)
Dept: SLEEP MEDICINE | Age: 37
Discharge: HOME | End: 2017-05-20
Payer: COMMERCIAL

## 2017-05-20 PROCEDURE — 95806 SLEEP STUDY UNATT&RESP EFFT: CPT | Performed by: INTERNAL MEDICINE

## 2017-05-24 ENCOUNTER — TELEPHONE (OUTPATIENT)
Dept: SLEEP MEDICINE | Age: 37
End: 2017-05-24

## 2017-05-25 ENCOUNTER — TELEPHONE (OUTPATIENT)
Dept: SLEEP MEDICINE | Age: 37
End: 2017-05-25

## 2017-05-25 ENCOUNTER — DOCUMENTATION ONLY (OUTPATIENT)
Dept: SLEEP MEDICINE | Age: 37
End: 2017-05-25

## 2017-05-25 NOTE — PROGRESS NOTES
This note is being routed to Dr. Tio Fernandes. Sleep Medicine consult note and sleep study report in patient's chart for review.     Thank you for the referral.

## 2017-06-13 ENCOUNTER — TELEPHONE (OUTPATIENT)
Dept: SLEEP MEDICINE | Age: 37
End: 2017-06-13

## 2017-06-13 NOTE — TELEPHONE ENCOUNTER
Tried to call patient. \"VM FULL\" message appeared. Need to notify her that her FMLA form is ready; should we mail it or will she  herself?

## 2017-11-09 ENCOUNTER — OFFICE VISIT (OUTPATIENT)
Dept: INTERNAL MEDICINE CLINIC | Age: 37
End: 2017-11-09

## 2017-11-09 VITALS
WEIGHT: 249.2 LBS | OXYGEN SATURATION: 96 % | DIASTOLIC BLOOD PRESSURE: 90 MMHG | BODY MASS INDEX: 42.55 KG/M2 | HEART RATE: 77 BPM | HEIGHT: 64 IN | TEMPERATURE: 98.6 F | RESPIRATION RATE: 18 BRPM | SYSTOLIC BLOOD PRESSURE: 120 MMHG

## 2017-11-09 DIAGNOSIS — E66.9 OBESITY (BMI 30-39.9): ICD-10-CM

## 2017-11-09 DIAGNOSIS — Z00.00 WELL WOMAN EXAM (NO GYNECOLOGICAL EXAM): Primary | ICD-10-CM

## 2017-11-09 DIAGNOSIS — Z23 ENCOUNTER FOR IMMUNIZATION: ICD-10-CM

## 2017-11-09 RX ORDER — GLUCOSAMINE SULFATE 1500 MG
1000 POWDER IN PACKET (EA) ORAL DAILY
COMMUNITY

## 2017-11-09 NOTE — PROGRESS NOTES
Chief Complaint   Patient presents with    Complete Physical     1. Have you been to the ER, urgent care clinic since your last visit? Hospitalized since your last visit? No    2. Have you seen or consulted any other health care providers outside of the [address] [place] [address] since your last visit? Include any pap smears or colon screening. Yes When: 5/2017 Where: Dermatologist Reason for visit: Dry hands    Congestion    Weight management    Patient received Tdap in office today. Administered Boostrix 0.5 ml in left deltoid, IM.  Pt tolerated well

## 2017-11-09 NOTE — PATIENT INSTRUCTIONS
It was a pleasure to see you! As discussed:    I have ordered your age appropriate labs please complete them. You will need to fast 10-12hrs before your lab appointment. Complete labs two weeks before your next appointment. You received the tetanus/pertussis vaccine today. Please see vaccine handout for more information and let us know if you have any reactions or concerns after the vaccination. Recommended \"Diets\"  Choose a healthy eating plan that works best for you. Some ideas are:  Whole 30 Diet: [email or web address]  Mediterranean Diet: [email or web address]  Low Carb Diet: LifeHead.nl  Fast Metabolism Diet: http://[employer]/books/the-fast-metabolism-diet/       Starting a Weight Loss Plan: Care Instructions  Your Care Instructions    If you are thinking about losing weight, it can be hard to know where to start. Your doctor can help you set up a weight loss plan that best meets your needs. You may want to take a class on nutrition or exercise, or join a weight loss support group. If you have questions about how to make changes to your eating or exercise habits, ask your doctor about seeing a registered dietitian or an exercise specialist.  It can be a big challenge to lose weight. But you do not have to make huge changes at once. Make small changes, and stick with them. When those changes become habit, add a few more changes. If you do not think you are ready to make changes right now, try to pick a date in the future. Make an appointment to see your doctor to discuss whether the time is right for you to start a plan. Follow-up care is a key part of your treatment and safety. Be sure to make and go to all appointments, and call your doctor if you are having problems.  It's also a good idea to know your test results and keep a list of the medicines you take. How can you care for yourself at home? · Set realistic goals. Many people expect to lose much more weight than is likely. A weight loss of 5% to 10% of your body weight may be enough to improve your health. · Get family and friends involved to provide support. Talk to them about why you are trying to lose weight, and ask them to help. They can help by participating in exercise and having meals with you, even if they may be eating something different. · Find what works best for you. If you do not have time or do not like to cook, a program that offers meal replacement bars or shakes may be better for you. Or if you like to prepare meals, finding a plan that includes daily menus and recipes may be best.  · Ask your doctor about other health professionals who can help you achieve your weight loss goals. ¨ A dietitian can help you make healthy changes in your diet. ¨ An exercise specialist or  can help you develop a safe and effective exercise program.  ¨ A counselor or psychiatrist can help you cope with issues such as depression, anxiety, or family problems that can make it hard to focus on weight loss. · Consider joining a support group for people who are trying to lose weight. Your doctor can suggest groups in your area. Where can you learn more? Go to http://vince-reva.info/. Enter O843 in the search box to learn more about \"Starting a Weight Loss Plan: Care Instructions. \"  Current as of: October 13, 2016  Content Version: 11.4  © 4983-7267 Healthwise, Incorporated. Care instructions adapted under license by [employer] (which disclaims liability or warranty for this information). If you have questions about a medical condition or this instruction, always ask your healthcare professional. [address] any warranty or liability for your use of this information.

## 2017-11-09 NOTE — MR AVS SNAPSHOT
Visit Information Date & Time Provider Department Dept. Phone Encounter #  
 11/9/2017 11:00 AM Matty Berrios MD [address] [address] Internal Medicine [phone] [id number] Follow-up Instructions Return in about 3 months (around 2/9/2018) for Follow-up. Upcoming Health Maintenance Date Due DTaP/Tdap/Td series (1 - Tdap) 11/4/2003 PAP AKA CERVICAL CYTOLOGY 11/4/2003 Influenza Age 5 to Adult 8/1/2017 Allergies as of 11/9/2017  Review Complete On: 11/9/2017 By: Matty Berrios MD  
 No Known Allergies Current Immunizations  Reviewed on 1/23/2015 Name Date Tdap  Incomplete Not reviewed this visit You Were Diagnosed With   
  
 Codes Comments Encounter for immunization    -  Primary ICD-10-CM: Z88 ICD-9-CM: V03.89 Vitals BP Pulse Temp Resp Height(growth percentile) Weight(growth percentile) 120/90 (BP 1 Location: Right arm, BP Patient Position: Sitting) 77 98.6 °F (37 °C) (Oral) 18 5' 4\" (1.626 m) 249 lb 3.2 oz (113 kg) LMP SpO2 BMI OB Status Smoking Status 08/15/2017 96% 42.78 kg/m2 Having regular periods Never Smoker Vitals History BMI and BSA Data Body Mass Index Body Surface Area 42.78 kg/m 2 2.26 m 2 Preferred Pharmacy Pharmacy Name Phone [id number] [address] [facility]. [phone] Your Updated Medication List  
  
   
This list is accurate as of: 11/9/17 11:58 AM.  Always use your most recent med list.  
  
  
  
  
 ergocalciferol 50,000 unit capsule Commonly known as:  ERGOCALCIFEROL Take 1 capsule by mouth every seven (7) days. GARCINIA CAMBOGIA PO Take  by mouth. MULTIVITAMIN PO Take 1 Tab by mouth daily. mupirocin 2 % ointment Commonly known as:  [employer] Apply  to affected area daily. PAPAYA ENZYME PO Take  by mouth. PROBIOTIC 4X 10-15 mg Tbec Generic drug:  B.infantis-B.ani-B.long-B.bifi Take  by mouth. VITAMIN D3 1,000 unit Cap Generic drug:  cholecalciferol Take 1,000 Units by mouth daily. We Performed the Following TETANUS, DIPHTHERIA TOXOIDS AND ACELLULAR PERTUSSIS VACCINE (TDAP), IN INDIVIDS. >=7, IM [id number] CPT(R)] Follow-up Instructions Return in about 3 months (around 2/9/2018) for Follow-up. Patient Instructions It was a pleasure to see you! As discussed: 
 
I have ordered your age appropriate labs please complete them. You will need to fast 10-12hrs before your lab appointment. Complete labs two weeks before your next appointment. You received the tetanus/pertussis vaccine today. Please see vaccine handout for more information and let us know if you have any reactions or concerns after the vaccination. Recommended \"Diets\" Choose a healthy eating plan that works best for you. Some ideas are: 
Whole 30 Diet: [email or web address]/ 
Mediterranean Diet: [email or web address].[email or web address] Low Carb Diet: [email or web address].nl Fast Metabolism Diet: http://Pictrition App/books/the-fast-metabolism-diet/ 
 
  
Starting a Weight Loss Plan: Care Instructions Your Care Instructions If you are thinking about losing weight, it can be hard to know where to start. Your doctor can help you set up a weight loss plan that best meets your needs. You may want to take a class on nutrition or exercise, or join a weight loss support group. If you have questions about how to make changes to your eating or exercise habits, ask your doctor about seeing a registered dietitian or an exercise specialist. 
It can be a big challenge to lose weight. But you do not have to make huge changes at once. Make small changes, and stick with them. When those changes become habit, add a few more changes. If you do not think you are ready to make changes right now, try to pick a date in the future. Make an appointment to see your doctor to discuss whether the time is right for you to start a plan. Follow-up care is a key part of your treatment and safety. Be sure to make and go to all appointments, and call your doctor if you are having problems. It's also a good idea to know your test results and keep a list of the medicines you take. How can you care for yourself at home? · Set realistic goals. Many people expect to lose much more weight than is likely. A weight loss of 5% to 10% of your body weight may be enough to improve your health. · Get family and friends involved to provide support. Talk to them about why you are trying to lose weight, and ask them to help. They can help by participating in exercise and having meals with you, even if they may be eating something different. · Find what works best for you. If you do not have time or do not like to cook, a program that offers meal replacement bars or shakes may be better for you. Or if you like to prepare meals, finding a plan that includes daily menus and recipes may be best. 
· Ask your doctor about other health professionals who can help you achieve your weight loss goals. ¨ A dietitian can help you make healthy changes in your diet. ¨ An exercise specialist or  can help you develop a safe and effective exercise program. 
¨ A counselor or psychiatrist can help you cope with issues such as depression, anxiety, or family problems that can make it hard to focus on weight loss. · Consider joining a support group for people who are trying to lose weight. Your doctor can suggest groups in your area. Where can you learn more? Go to http://vince-reva.info/. Enter J954 in the search box to learn more about \"Starting a Weight Loss Plan: Care Instructions. \" Current as of: October 13, 2016 Content Version: 11.4 © 6094-4810 Healthwise, Incorporated. Care instructions adapted under license by [employer] (which disclaims liability or warranty for this information). If you have questions about a medical condition or this instruction, always ask your healthcare professional. [address] any warranty or liability for your use of this information. Introducing \[facility]\"" & HEALTH SERVICES! Dear Ayesha Amaya: Thank you for requesting a [employer] account. Our records indicate that you already have an active [employer] account. You can access your account anytime at [email or web address] Did you know that you can access your hospital and ER discharge instructions at any time in [employer]? You can also review all of your test results from your hospital stay or ER visit. Additional Information If you have questions, please visit the Frequently Asked Questions section of the [employer] website at [email or web address] Remember, [employer] is NOT to be used for urgent needs. For medical emergencies, dial 911. Now available from your iPhone and Android! Please provide this summary of care documentation to your next provider. Your primary care clinician is listed as Elizabeth Rodriguez. If you have any questions after today's visit, please call [phone].

## 2017-11-09 NOTE — PROGRESS NOTES
HISTORY OF PRESENT ILLNESS  Lavinia Chavarria is a 30 y.o. female. HPI  Health Maintenance   Topic Date Due    DTaP/Tdap/Td series (1 - Tdap) Done today     PAP AKA CERVICAL CYTOLOGY  UTD     Influenza Age 5 to Adult  08/01/2017     Cardiovascular Review:  The patient has obesity Body mass index is 42.78 kg/(m^2). Diet and Lifestyle: exercises sporadically, nonsmoker   Self Care Inventory  Sleep: 8hrs , Sleep study wn;   Eating Habits: Drinks only water   Support: Strong   Spiritual Life: Positive thinking   Exercise: <2.5hr  Stress: High  Home BP Monitoring: is not measured at home. Pertinent ROS: no TIA's, no chest pain on exertion, no dyspnea on exertion, no swelling of ankles. SHx: Has had young friends die. Most recent age 36. Review of Systems   Constitutional: Negative for diaphoresis, fever and weight loss. Eyes: Negative for blurred vision and pain. Respiratory: Negative for shortness of breath. Cardiovascular: Negative for chest pain, orthopnea and leg swelling. Neurological: Negative for focal weakness and headaches. Psychiatric/Behavioral: Negative for depression. Patient Active Problem List    Diagnosis Date Noted    Allergic rhinitis 06/11/2010       Current Outpatient Prescriptions   Medication Sig Dispense Refill    cholecalciferol (VITAMIN D3) 1,000 unit cap Take 1,000 Units by mouth daily.  PAPAYA ENZYME PO Take  by mouth.  MULTIVITAMIN PO Take 1 Tab by mouth daily.  B.infantis-B.ani-B.long-B.bifi (PROBIOTIC 4X) 10-15 mg TbEC Take  by mouth.  mupirocin (BACTROBAN) 2 % ointment Apply  to affected area daily. 22 g 0    ergocalciferol (ERGOCALCIFEROL) 50,000 unit capsule Take 1 capsule by mouth every seven (7) days. 8 capsule 0    CHROM ABDIRASHID/BRINDAL BERRY (GARCINIA CAMBOGIA PO) Take  by mouth.          No Known Allergies   Visit Vitals    /90 (BP 1 Location: Right arm, BP Patient Position: Sitting)    Pulse 77    Temp 98.6 °F (37 °C) (Oral)  Ht 5' 4\" (1.626 m)    Wt 249 lb 3.2 oz (113 kg)    SpO2 96%    BMI 42.78 kg/m2       Physical Exam   Constitutional: She is oriented to person, place, and time. She appears well-developed and well-nourished. HENT:   Right Ear: External ear normal.   Left Ear: External ear normal.   Mouth/Throat: Oropharynx is clear and moist. No oropharyngeal exudate. Eyes: Conjunctivae are normal. No scleral icterus. Neck: Normal range of motion. Neck supple. No thyromegaly (possibly ) present. Cardiovascular: Normal rate, regular rhythm and normal heart sounds. Exam reveals no gallop and no friction rub. No murmur heard. Pulmonary/Chest: Effort normal and breath sounds normal. No respiratory distress. She has no wheezes. She has no rales. She exhibits no tenderness. Abdominal: Soft. Bowel sounds are normal. She exhibits no distension. There is no tenderness. There is no rebound and no guarding. Genitourinary: Rectal exam shows guaiac negative stool. Genitourinary Comments: Deferred for gyn per pt request   Musculoskeletal: Normal range of motion. She exhibits no edema or tenderness. Neurological: She is alert and oriented to person, place, and time. Psychiatric: She has a normal mood and affect. ASSESSMENT and PLAN  Diagnoses and all orders for this visit:    1. Well woman exam (no gynecological exam)- reminded to complete labs     2. Encounter for immunization-  received in tdap clinic without complication.    -     TETANUS, DIPHTHERIA TOXOIDS AND ACELLULAR PERTUSSIS VACCINE (TDAP), IN INDIVIDS. >=7, IM    3. Obesity (BMI 30-39. 9)- I have reviewed/discussed the above normal BMI with the patient. I have recommended the following interventions: dietary management education, guidance, and counseling . Pawan Meyer Follow-up Disposition:  Return in about 3 months (around 2/9/2018) for Follow-up. Medication risks/benefits/costs/interactions/alternatives discussed with patient.   Christ Sandoval was given an after visit summary which includes diagnoses, current medications, & vitals. she expressed understanding with the diagnosis and plan.

## 2018-02-08 LAB
25(OH)D3+25(OH)D2 SERPL-MCNC: 33.4 NG/ML (ref 30–100)
ALBUMIN SERPL-MCNC: 4.6 G/DL (ref 3.5–5.5)
ALBUMIN/GLOB SERPL: 1.7 {RATIO} (ref 1.2–2.2)
ALP SERPL-CCNC: 105 IU/L (ref 39–117)
ALT SERPL-CCNC: 18 IU/L (ref 0–32)
AST SERPL-CCNC: 21 IU/L (ref 0–40)
BASOPHILS # BLD AUTO: 0 X10E3/UL (ref 0–0.2)
BASOPHILS NFR BLD AUTO: 0 %
BILIRUB SERPL-MCNC: 0.3 MG/DL (ref 0–1.2)
BUN SERPL-MCNC: 16 MG/DL (ref 6–20)
BUN/CREAT SERPL: 23 (ref 9–23)
CALCIUM SERPL-MCNC: 9.6 MG/DL (ref 8.7–10.2)
CHLORIDE SERPL-SCNC: 100 MMOL/L (ref 96–106)
CHOLEST SERPL-MCNC: 168 MG/DL (ref 100–199)
CO2 SERPL-SCNC: 24 MMOL/L (ref 18–29)
CREAT SERPL-MCNC: 0.69 MG/DL (ref 0.57–1)
EOSINOPHIL # BLD AUTO: 0.1 X10E3/UL (ref 0–0.4)
EOSINOPHIL NFR BLD AUTO: 1 %
ERYTHROCYTE [DISTWIDTH] IN BLOOD BY AUTOMATED COUNT: 14.7 % (ref 12.3–15.4)
EST. AVERAGE GLUCOSE BLD GHB EST-MCNC: 143 MG/DL
FT4I SERPL CALC-MCNC: 1.6 (ref 1.2–4.9)
GFR SERPLBLD CREATININE-BSD FMLA CKD-EPI: 113 ML/MIN/1.73
GFR SERPLBLD CREATININE-BSD FMLA CKD-EPI: 130 ML/MIN/1.73
GLOBULIN SER CALC-MCNC: 2.7 G/DL (ref 1.5–4.5)
GLUCOSE SERPL-MCNC: 102 MG/DL (ref 65–99)
HBA1C MFR BLD: 6.6 % (ref 4.8–5.6)
HCT VFR BLD AUTO: 34.8 % (ref 34–46.6)
HDLC SERPL-MCNC: 55 MG/DL
HGB BLD-MCNC: 11.7 G/DL (ref 11.1–15.9)
IMM GRANULOCYTES # BLD: 0 X10E3/UL (ref 0–0.1)
IMM GRANULOCYTES NFR BLD: 0 %
LDLC SERPL CALC-MCNC: 94 MG/DL (ref 0–99)
LYMPHOCYTES # BLD AUTO: 2.2 X10E3/UL (ref 0.7–3.1)
LYMPHOCYTES NFR BLD AUTO: 31 %
MCH RBC QN AUTO: 28.1 PG (ref 26.6–33)
MCHC RBC AUTO-ENTMCNC: 33.6 G/DL (ref 31.5–35.7)
MCV RBC AUTO: 84 FL (ref 79–97)
MONOCYTES # BLD AUTO: 0.5 X10E3/UL (ref 0.1–0.9)
MONOCYTES NFR BLD AUTO: 7 %
NEUTROPHILS # BLD AUTO: 4.2 X10E3/UL (ref 1.4–7)
NEUTROPHILS NFR BLD AUTO: 61 %
PLATELET # BLD AUTO: 396 X10E3/UL (ref 150–379)
POTASSIUM SERPL-SCNC: 4.1 MMOL/L (ref 3.5–5.2)
PROT SERPL-MCNC: 7.3 G/DL (ref 6–8.5)
RBC # BLD AUTO: 4.16 X10E6/UL (ref 3.77–5.28)
SODIUM SERPL-SCNC: 141 MMOL/L (ref 134–144)
T3RU NFR SERPL: 24 % (ref 24–39)
T4 SERPL-MCNC: 6.7 UG/DL (ref 4.5–12)
TRIGL SERPL-MCNC: 96 MG/DL (ref 0–149)
TSH SERPL DL<=0.005 MIU/L-ACNC: 3.2 UIU/ML (ref 0.45–4.5)
VLDLC SERPL CALC-MCNC: 19 MG/DL (ref 5–40)
WBC # BLD AUTO: 6.9 X10E3/UL (ref 3.4–10.8)

## 2018-02-12 ENCOUNTER — OFFICE VISIT (OUTPATIENT)
Dept: INTERNAL MEDICINE CLINIC | Age: 38
End: 2018-02-12

## 2018-02-12 VITALS
DIASTOLIC BLOOD PRESSURE: 80 MMHG | RESPIRATION RATE: 16 BRPM | HEART RATE: 119 BPM | SYSTOLIC BLOOD PRESSURE: 128 MMHG | BODY MASS INDEX: 42.61 KG/M2 | OXYGEN SATURATION: 98 % | TEMPERATURE: 98.4 F | HEIGHT: 64 IN | WEIGHT: 249.6 LBS

## 2018-02-12 DIAGNOSIS — J30.89 CHRONIC NONSEASONAL ALLERGIC RHINITIS DUE TO OTHER ALLERGEN: ICD-10-CM

## 2018-02-12 DIAGNOSIS — E88.81 INSULIN RESISTANCE: Primary | ICD-10-CM

## 2018-02-12 DIAGNOSIS — E66.01 OBESITY, MORBID, BMI 40.0-49.9 (HCC): ICD-10-CM

## 2018-02-12 RX ORDER — METFORMIN HYDROCHLORIDE 750 MG/1
750 TABLET, EXTENDED RELEASE ORAL DAILY
Qty: 60 TAB | Refills: 3 | Status: SHIPPED | OUTPATIENT
Start: 2018-02-12

## 2018-02-12 NOTE — PROGRESS NOTES
Chief Complaint   Patient presents with    Obesity     1. Have you been to the ER, urgent care clinic since your last visit? Hospitalized since your last visit? No    2. Have you seen or consulted any other health care providers outside of the [address] [place] [address] since your last visit? Include any pap smears or colon screening.  Yes Where: Dental  Reason for visit: Emergency procedure

## 2018-02-12 NOTE — PROGRESS NOTES
Discussed at 02/12/18 appt. See office note     Lab Review   Elevated blood sugar  Your blood work shows that your blood sugar has been elevated for the past three months in the diabetic range. To confirm the diagnosis of diabetes we will recheck your blood sugar at your next appointment. In the interim please follow the dietary interventions we discussed. And watch for any warning signs of very high blood sugar such excessive thirst, fatigue, blurred vision- if you have these symptoms seek immediate medical attention. Start Metformin as ordered. Your labs were clinically normal/ stable.   Some labs that may have been tested and their explanation are:  Your electrolytes, kidney & liver function (Metabolic Panel)   Anemia, blood cells (CBC)  Thyroid (TSH + T4, T3)  Hormones (prolactin, vitamin D )   Pregnancy (Beta HCG)    Diabetes (Hemoglobin A1c)   Lipid Panel (Cholesterol, HDL \"good\", LDL \"bad\")

## 2018-02-12 NOTE — PATIENT INSTRUCTIONS
It was a pleasure to see you! As discussed:    Lab Review   Elevated blood sugar  Your blood work shows that your blood sugar has been elevated for the past three months in the diabetic range. To confirm the diagnosis of diabetes we will recheck your blood sugar at your next appointment. In the interim please follow the dietary interventions we discussed. And watch for any warning signs of very high blood sugar such excessive thirst, fatigue, blurred vision- if you have these symptoms seek immediate medical attention. Start Metformin as ordered. Your labs were clinically normal/ stable. Some labs that may have been tested and their explanation are:  Your electrolytes, kidney & liver function (Metabolic Panel)   Anemia, blood cells (CBC)  Thyroid (TSH + T4, T3)  Hormones (prolactin, vitamin D )   Pregnancy (Beta HCG)    Diabetes (Hemoglobin A1c)   Lipid Panel (Cholesterol, HDL \"good\", LDL \"bad\")     Three Goals (Big Goal: at least  3-5 lbs weight loss by next visit)  1. Schedule with dietician  Natalie Cleary    [phone]    2. optimizing your carbohydrate and refined sugar intake (goal carbs 150-200g/ day, refined sugar <50g/ day)   3. Stop sweet tea and fried chicken. Learning About Carbohydrates  What are carbohydrates? Carbohydrates are an important nutrient you get from food. It's a great source of energy for your body and helps your brain and nervous system work properly. How does your body use carbohydrates? After you eat food with carbs in it, your body digests the carbohydrates and turns them into a kind of sugar that goes into your blood. The blood carries this sugar to the cells in your body. The cells use the sugar to give you energy. Extra sugar is stored in the cells for later use. If it isn't used, it turns into fat. Where do carbohydrates come from?   The healthiest carbohydrate choices are breads, cereals, and pastas made with whole grains; brown rice; low-fat dairy products; vegetables; legumes such as peas, lentils, and beans; and fruits. Foods made from refined flour, including bread, pasta, doughnuts, cookies, and desserts, also contain carbohydrates. So do sweets such as candy and soda. How can you get the right kind and amount of carbs? Eating too much of anything can lead to weight gain. And that can lead to other health problems. Here are some tips to help you eat the right amount of the right kind of carbs so you have the nutrition and the energy you need:  · Eat 3 to 8 servings of grains (breads, cereals, rice, pasta) each day. For example, a serving is 1 slice of bread, 1 cup of boxed cereal, or ½ cup of cooked rice, cooked pasta, or cooked cereal. Go to www. choosemyplate.gov to learn how many servings you need. ¨ Buy bread that lists whole wheat (or other whole grains), stone-ground wheat, or cracked wheat as the first ingredient. ¨ Eat brown rice, bulgur, or millet instead of white rice. ¨ Eat pasta and cereals made from whole grain flour instead of refined flour. · Eat several servings a day of fresh fruits and vegetables. These include raspberries, apples, figs, oranges, pears, prunes, broccoli, brussels sprouts, carrots, corn, peas, and beans. And there are lots of other fruits and vegetables to choose from. · Limit the amount of candy, desserts, and soda in your diet. Where can you learn more? Go to http://vince-reva.info/. Enter F199 in the search box to learn more about \"Learning About Carbohydrates. \"  Current as of: May 12, 2017  Content Version: 11.4  © 3929-5279 [employer]. Care instructions adapted under license by [employer] (which disclaims liability or warranty for this information). If you have questions about a medical condition or this instruction, always ask your healthcare professional. [place] [address] any warranty or liability for your use of this information. Learning About Meal Planning for Diabetes  Why plan your meals? Meal planning can be a key part of managing diabetes. Planning meals and snacks with the right balance of carbohydrate, protein, and fat can help you keep your blood sugar at the target level you set with your doctor. You don't have to eat special foods. You can eat what your family eats, including sweets once in a while. But you do have to pay attention to how often you eat and how much you eat of certain foods. You may want to work with a dietitian or a certified diabetes educator. He or she can give you tips and meal ideas and can answer your questions about meal planning. This health professional can also help you reach a healthy weight if that is one of your goals. What plan is right for you? Your dietitian or diabetes educator may suggest that you start with the plate format or carbohydrate counting. The plate format  The plate format is a simple way to help you manage how you eat. You plan meals by learning how much space each food should take on a plate. Using the plate format helps you spread carbohydrate throughout the day. It can make it easier to keep your blood sugar level within your target range. It also helps you see if you're eating healthy portion sizes. To use the plate format, you put non-starchy vegetables on half your plate. Add meat or meat substitutes on one-quarter of the plate. Put a grain or starchy vegetable (such as brown rice or a potato) on the final quarter of the plate. You can add a small piece of fruit and some low-fat or fat-free milk or yogurt, depending on your carbohydrate goal for each meal.  Here are some tips for using the plate format:  · Make sure that you are not using an oversized plate. A 9-inch plate is best. Many restaurants use larger plates. · Get used to using the plate format at home. Then you can use it when you eat out. · Write down your questions about using the plate format.  Talk to your doctor, a dietitian, or a diabetes educator about your concerns. Carbohydrate counting  With carbohydrate counting, you plan meals based on the amount of carbohydrate in each food. Carbohydrate raises blood sugar higher and more quickly than any other nutrient. It is found in desserts, breads and cereals, and fruit. It's also found in starchy vegetables such as potatoes and corn, grains such as rice and pasta, and milk and yogurt. Spreading carbohydrate throughout the day helps keep your blood sugar levels within your target range. Your daily amount depends on several things, including your weight, how active you are, which diabetes medicines you take, and what your goals are for your blood sugar levels. A registered dietitian or diabetes educator can help you plan how much carbohydrate to include in each meal and snack. A guideline for your daily amount of carbohydrate is:  · 45 to 60 grams at each meal. That's about the same as 3 to 4 carbohydrate servings. · 15 to 20 grams at each snack. That's about the same as 1 carbohydrate serving. The Nutrition Facts label on packaged foods tells you how much carbohydrate is in a serving of the food. First, look at the serving size on the food label. Is that the amount you eat in a serving? All of the nutrition information on a food label is based on that serving size. So if you eat more or less than that, you'll need to adjust the other numbers. Total carbohydrate is the next thing you need to look for on the label. If you count carbohydrate servings, one serving of carbohydrate is 15 grams. For foods that don't come with labels, such as fresh fruits and vegetables, you'll need a guide that lists carbohydrate in these foods. Ask your doctor, dietitian, or diabetes educator about books or other nutrition guides you can use.   If you take insulin, you need to know how many grams of carbohydrate are in a meal. This lets you know how much rapid-acting insulin to take before you eat. If you use an insulin pump, you get a constant rate of insulin during the day. So the pump must be programmed at meals to give you extra insulin to cover the rise in blood sugar after meals. When you know how much carbohydrate you will eat, you can take the right amount of insulin. Or, if you always use the same amount of insulin, you need to make sure that you eat the same amount of carbohydrate at meals. If you need more help to understand carbohydrate counting and food labels, ask your doctor, dietitian, or diabetes educator. How do you get started with meal planning? Here are some tips to get started:  · Plan your meals a week at a time. Don't forget to include snacks too. · Use cookbooks or online recipes to plan several main meals. Plan some quick meals for busy nights. You also can double some recipes that freeze well. Then you can save half for other busy nights when you don't have time to cook. · Make sure you have the ingredients you need for your recipes. If you're running low on basic items, put these items on your shopping list too. · List foods that you use to make breakfasts, lunches, and snacks. List plenty of fruits and vegetables. · Post this list on the refrigerator. Add to it as you think of more things you need. · Take the list to the store to do your weekly shopping. Follow-up care is a key part of your treatment and safety. Be sure to make and go to all appointments, and call your doctor if you are having problems. It's also a good idea to know your test results and keep a list of the medicines you take. Where can you learn more? Go to http://vince-reva.info/. Real Knows in the search box to learn more about \"Learning About Meal Planning for Diabetes. \"  Current as of: March 13, 2017  Content Version: 11.4  © 7248-5283 [employer].  Care instructions adapted under license by [employer] (which disclaims liability or warranty for this information). If you have questions about a medical condition or this instruction, always ask your healthcare professional. [place] [address] any warranty or liability for your use of this information.

## 2018-02-12 NOTE — PROGRESS NOTES
HISTORY OF PRESENT ILLNESS  Elisa Ca is a 30 y.o. female. HPI  Cardiovascular Review:  The patient has diabetes and obesity Body mass index is 42.84 kg/(m^2). Diet and Lifestyle: not attempting to follow a low fat, low cholesterol diet, does not rigorously follow a diabetic diet, nonsmoker, Drinks Sweet tea   Home BP Monitoring: is not measured at home. Pertinent ROS: no TIA's, no chest pain on exertion, no dyspnea on exertion, no swelling of ankles. Review of Systems   Constitutional: Negative for diaphoresis, fever and weight loss. Eyes: Negative for blurred vision and pain. Respiratory: Negative for shortness of breath. Cardiovascular: Negative for chest pain, orthopnea and leg swelling. Neurological: Negative for focal weakness and headaches. Psychiatric/Behavioral: Negative for depression. Patient Active Problem List    Diagnosis Date Noted    Obesity, morbid ([facility] [address] 02/12/2018    Allergic rhinitis 06/11/2010       Current Outpatient Prescriptions   Medication Sig Dispense Refill    cholecalciferol (VITAMIN D3) 1,000 unit cap Take 1,000 Units by mouth daily.  PAPAYA ENZYME PO Take  by mouth.  MULTIVITAMIN PO Take 1 Tab by mouth daily.  B.infantis-B.ani-B.long-B.bifi (PROBIOTIC 4X) 10-15 mg TbEC Take  by mouth.  mupirocin (BACTROBAN) 2 % ointment Apply  to affected area daily. 22 g 0    ergocalciferol (ERGOCALCIFEROL) 50,000 unit capsule Take 1 capsule by mouth every seven (7) days. 8 capsule 0    CHROM ABDIRASHID/BRINDAL BERRY (GARCINIA CAMBOGIA PO) Take  by mouth. No Known Allergies   Visit Vitals    /80 (BP 1 Location: Right arm, BP Patient Position: Sitting)    Pulse (!) 119    Temp 98.4 °F (36.9 °C) (Oral)    Resp 16    Ht 5' 4\" (1.626 m)    Wt 249 lb 9.6 oz (113.2 kg)    SpO2 98%    BMI 42.84 kg/m2       Physical Exam   Constitutional: She is oriented to person, place, and time. She appears well-developed. No distress.    Eyes: Conjunctivae are normal.   Neck: Neck supple. Cardiovascular: Normal rate, regular rhythm and normal heart sounds. Pulmonary/Chest: Effort normal and breath sounds normal. No respiratory distress. She has no wheezes. She has no rales. She exhibits no tenderness. Neurological: She is alert and oriented to person, place, and time. Skin: Skin is warm. Psychiatric: She has a normal mood and affect. Lab Results  Component Value Date/Time   WBC 6.9 02/07/2018 05:07 PM   HGB 11.7 02/07/2018 05:07 PM   HCT 34.8 02/07/2018 05:07 PM   PLATELET 992 (H) 83/53/6599 05:07 PM   MCV 84 02/07/2018 05:07 PM     Lab Results  Component Value Date/Time   Hemoglobin A1c 6.6 (H) 02/07/2018 05:07 PM   Hemoglobin A1c 6.1 (H) 01/23/2015 02:28 PM   Glucose 102 (H) 02/07/2018 05:07 PM   LDL, calculated 94 02/07/2018 05:07 PM   Creatinine 0.69 02/07/2018 05:07 PM      Lab Results  Component Value Date/Time   Cholesterol, total 168 02/07/2018 05:07 PM   HDL Cholesterol 55 02/07/2018 05:07 PM   LDL, calculated 94 02/07/2018 05:07 PM   Triglyceride 96 02/07/2018 05:07 PM     Lab Results  Component Value Date/Time   ALT (SGPT) 18 02/07/2018 05:07 PM   AST (SGOT) 21 02/07/2018 05:07 PM   Alk.  phosphatase 105 02/07/2018 05:07 PM   Bilirubin, total 0.3 02/07/2018 05:07 PM   Albumin 4.6 02/07/2018 05:07 PM   Protein, total 7.3 02/07/2018 05:07 PM   PLATELET 608 (H) 88/73/4269 05:07 PM       Lab Results  Component Value Date/Time   GFR est non- 02/07/2018 05:07 PM   GFR est  02/07/2018 05:07 PM   Creatinine 0.69 02/07/2018 05:07 PM   BUN 16 02/07/2018 05:07 PM   Sodium 141 02/07/2018 05:07 PM   Potassium 4.1 02/07/2018 05:07 PM   Chloride 100 02/07/2018 05:07 PM   CO2 24 02/07/2018 05:07 PM     Lab Results  Component Value Date/Time   TSH 3.200 02/07/2018 05:07 PM   T3 Uptake 24 02/07/2018 05:07 PM   T4, Total 6.7 02/07/2018 05:07 PM      Lab Results   Component Value Date/Time    Glucose 102 (H) 02/07/2018 05:07 PM ASSESSMENT and PLAN  Diagnoses and all orders for this visit:    1. Insulin resistance- start metformin. Optimize diet. Check A1c prior to next appointment. Lab Review   Elevated blood sugar  Your blood work shows that your blood sugar has been elevated for the past three months in the diabetic range. To confirm the diagnosis of diabetes we will recheck your blood sugar at your next appointment. In the interim please follow the dietary interventions we discussed. And watch for any warning signs of very high blood sugar such excessive thirst, fatigue, blurred vision- if you have these symptoms seek immediate medical attention. Start Metformin as ordered. Your labs were clinically normal/ stable. Some labs that may have been tested and their explanation are:  Your electrolytes, kidney & liver function (Metabolic Panel)   Anemia, blood cells (CBC)  Thyroid (TSH + T4, T3)  Hormones (prolactin, vitamin D )   Pregnancy (Beta HCG)    Diabetes (Hemoglobin A1c)   Lipid Panel (Cholesterol, HDL \"good\", LDL \"bad\")     -     metFORMIN ER (GLUCOPHAGE XR) 750 mg tablet; Take 1 Tab by mouth daily. Week 1, Starting week 2 and beyond take 2 tabs by mouth daily at dinner  -     REFERRAL TO NUTRITION    2. Obesity, morbid, BMI 40.0-49.9 ([facility] [address]- now with insulin resistance we discussed the impact of untreated obesity on her   long term health. she has committed to engaging in a weight loss plan. We discussed multiple options. she would like to attempt weight loss on her  own through seeing a dietician I have reviewed/discussed the above normal BMI with the patient. I have recommended the following interventions: dietary management education, guidance, and counseling . Jane Mas See AVS for full details of plan and patient discussion. 3. Chronic nonseasonal allergic rhinitis due to other allergen- stable.        Follow-up Disposition:  Return in about 3 months (around 5/12/2018) for Follow-up, Weight Management. Medication risks/benefits/costs/interactions/alternatives discussed with patient. Ashley Estrada  was given an after visit summary which includes diagnoses, current medications, & vitals. she expressed understanding with the diagnosis and plan.

## 2023-05-23 RX ORDER — ERGOCALCIFEROL 1.25 MG/1
50000 CAPSULE ORAL
COMMUNITY
Start: 2015-01-27

## 2023-05-23 RX ORDER — METFORMIN HYDROCHLORIDE 750 MG/1
750 TABLET, EXTENDED RELEASE ORAL DAILY
COMMUNITY
Start: 2018-02-12